# Patient Record
Sex: MALE | Race: WHITE | Employment: FULL TIME | URBAN - NONMETROPOLITAN AREA
[De-identification: names, ages, dates, MRNs, and addresses within clinical notes are randomized per-mention and may not be internally consistent; named-entity substitution may affect disease eponyms.]

---

## 2021-02-10 ENCOUNTER — HOSPITAL ENCOUNTER (OUTPATIENT)
Age: 56
Setting detail: OBSERVATION
Discharge: HOME | End: 2021-02-10
Attending: EMERGENCY MEDICINE | Admitting: INTERNAL MEDICINE
Payer: COMMERCIAL

## 2021-02-10 ENCOUNTER — APPOINTMENT (OUTPATIENT)
Dept: GENERAL RADIOLOGY | Age: 56
End: 2021-02-10
Payer: COMMERCIAL

## 2021-02-10 ENCOUNTER — APPOINTMENT (OUTPATIENT)
Dept: NON INVASIVE DIAGNOSTICS | Age: 56
End: 2021-02-10
Payer: COMMERCIAL

## 2021-02-10 VITALS
OXYGEN SATURATION: 97 % | WEIGHT: 315 LBS | DIASTOLIC BLOOD PRESSURE: 81 MMHG | SYSTOLIC BLOOD PRESSURE: 170 MMHG | HEIGHT: 73 IN | RESPIRATION RATE: 19 BRPM | TEMPERATURE: 97.3 F | HEART RATE: 82 BPM | BODY MASS INDEX: 41.75 KG/M2

## 2021-02-10 DIAGNOSIS — I20.0 UNSTABLE ANGINA (HCC): Primary | ICD-10-CM

## 2021-02-10 LAB
ABSOLUTE EOS #: 0.11 K/UL (ref 0–0.44)
ABSOLUTE IMMATURE GRANULOCYTE: <0.03 K/UL (ref 0–0.3)
ABSOLUTE LYMPH #: 1.76 K/UL (ref 1.1–3.7)
ABSOLUTE MONO #: 0.37 K/UL (ref 0.1–1.2)
ANION GAP SERPL CALCULATED.3IONS-SCNC: 8 MMOL/L (ref 9–17)
BASOPHILS # BLD: 1 % (ref 0–2)
BASOPHILS ABSOLUTE: 0.03 K/UL (ref 0–0.2)
BNP INTERPRETATION: NORMAL
BUN BLDV-MCNC: 13 MG/DL (ref 6–20)
BUN/CREAT BLD: 21 (ref 9–20)
CALCIUM SERPL-MCNC: 9 MG/DL (ref 8.6–10.4)
CHLORIDE BLD-SCNC: 103 MMOL/L (ref 98–107)
CO2: 26 MMOL/L (ref 20–31)
CREAT SERPL-MCNC: 0.63 MG/DL (ref 0.7–1.2)
D-DIMER QUANTITATIVE: 0.31 MG/L FEU (ref 0–0.59)
DIFFERENTIAL TYPE: NORMAL
EKG ATRIAL RATE: 60 BPM
EKG ATRIAL RATE: 69 BPM
EKG ATRIAL RATE: 69 BPM
EKG P AXIS: 51 DEGREES
EKG P AXIS: 54 DEGREES
EKG P AXIS: 67 DEGREES
EKG P-R INTERVAL: 166 MS
EKG P-R INTERVAL: 168 MS
EKG P-R INTERVAL: 170 MS
EKG Q-T INTERVAL: 406 MS
EKG Q-T INTERVAL: 410 MS
EKG Q-T INTERVAL: 432 MS
EKG QRS DURATION: 92 MS
EKG QTC CALCULATION (BAZETT): 432 MS
EKG QTC CALCULATION (BAZETT): 435 MS
EKG QTC CALCULATION (BAZETT): 439 MS
EKG R AXIS: -2 DEGREES
EKG R AXIS: -5 DEGREES
EKG R AXIS: 6 DEGREES
EKG T AXIS: 20 DEGREES
EKG T AXIS: 21 DEGREES
EKG T AXIS: 30 DEGREES
EKG VENTRICULAR RATE: 60 BPM
EKG VENTRICULAR RATE: 69 BPM
EKG VENTRICULAR RATE: 69 BPM
EOSINOPHILS RELATIVE PERCENT: 2 % (ref 1–4)
GFR AFRICAN AMERICAN: >60 ML/MIN
GFR NON-AFRICAN AMERICAN: >60 ML/MIN
GFR SERPL CREATININE-BSD FRML MDRD: ABNORMAL ML/MIN/{1.73_M2}
GFR SERPL CREATININE-BSD FRML MDRD: ABNORMAL ML/MIN/{1.73_M2}
GLUCOSE BLD-MCNC: 98 MG/DL (ref 70–99)
HCT VFR BLD CALC: 48.9 % (ref 40.7–50.3)
HEMOGLOBIN: 15.8 G/DL (ref 13–17)
IMMATURE GRANULOCYTES: 0 %
LV EF: 60 %
LVEF MODALITY: NORMAL
LYMPHOCYTES # BLD: 27 % (ref 24–43)
MCH RBC QN AUTO: 28 PG (ref 25.2–33.5)
MCHC RBC AUTO-ENTMCNC: 32.3 G/DL (ref 28.4–34.8)
MCV RBC AUTO: 86.7 FL (ref 82.6–102.9)
MONOCYTES # BLD: 6 % (ref 3–12)
NRBC AUTOMATED: 0 PER 100 WBC
PDW BLD-RTO: 13.1 % (ref 11.8–14.4)
PLATELET # BLD: 172 K/UL (ref 138–453)
PLATELET ESTIMATE: NORMAL
PMV BLD AUTO: 10.1 FL (ref 8.1–13.5)
POTASSIUM SERPL-SCNC: 4.1 MMOL/L (ref 3.7–5.3)
PRO-BNP: 157 PG/ML
RBC # BLD: 5.64 M/UL (ref 4.21–5.77)
RBC # BLD: NORMAL 10*6/UL
SEG NEUTROPHILS: 64 % (ref 36–65)
SEGMENTED NEUTROPHILS ABSOLUTE COUNT: 4.24 K/UL (ref 1.5–8.1)
SODIUM BLD-SCNC: 137 MMOL/L (ref 135–144)
TROPONIN INTERP: NORMAL
TROPONIN T: NORMAL NG/ML
TROPONIN, HIGH SENSITIVITY: <6 NG/L (ref 0–22)
TSH SERPL DL<=0.05 MIU/L-ACNC: 3.5 MIU/L (ref 0.3–5)
WBC # BLD: 6.5 K/UL (ref 3.5–11.3)
WBC # BLD: NORMAL 10*3/UL

## 2021-02-10 PROCEDURE — 6360000002 HC RX W HCPCS: Performed by: NURSE PRACTITIONER

## 2021-02-10 PROCEDURE — 99285 EMERGENCY DEPT VISIT HI MDM: CPT

## 2021-02-10 PROCEDURE — 96375 TX/PRO/DX INJ NEW DRUG ADDON: CPT

## 2021-02-10 PROCEDURE — 83036 HEMOGLOBIN GLYCOSYLATED A1C: CPT

## 2021-02-10 PROCEDURE — 85379 FIBRIN DEGRADATION QUANT: CPT

## 2021-02-10 PROCEDURE — 93010 ELECTROCARDIOGRAM REPORT: CPT | Performed by: FAMILY MEDICINE

## 2021-02-10 PROCEDURE — G0378 HOSPITAL OBSERVATION PER HR: HCPCS

## 2021-02-10 PROCEDURE — 6370000000 HC RX 637 (ALT 250 FOR IP): Performed by: EMERGENCY MEDICINE

## 2021-02-10 PROCEDURE — 2580000003 HC RX 258: Performed by: NURSE PRACTITIONER

## 2021-02-10 PROCEDURE — 2500000003 HC RX 250 WO HCPCS: Performed by: EMERGENCY MEDICINE

## 2021-02-10 PROCEDURE — 6370000000 HC RX 637 (ALT 250 FOR IP): Performed by: NURSE PRACTITIONER

## 2021-02-10 PROCEDURE — 99244 OFF/OP CNSLTJ NEW/EST MOD 40: CPT | Performed by: FAMILY MEDICINE

## 2021-02-10 PROCEDURE — 85025 COMPLETE CBC W/AUTO DIFF WBC: CPT

## 2021-02-10 PROCEDURE — 96366 THER/PROPH/DIAG IV INF ADDON: CPT

## 2021-02-10 PROCEDURE — 84443 ASSAY THYROID STIM HORMONE: CPT

## 2021-02-10 PROCEDURE — 71045 X-RAY EXAM CHEST 1 VIEW: CPT

## 2021-02-10 PROCEDURE — 96365 THER/PROPH/DIAG IV INF INIT: CPT

## 2021-02-10 PROCEDURE — 83880 ASSAY OF NATRIURETIC PEPTIDE: CPT

## 2021-02-10 PROCEDURE — 96372 THER/PROPH/DIAG INJ SC/IM: CPT

## 2021-02-10 PROCEDURE — 93306 TTE W/DOPPLER COMPLETE: CPT

## 2021-02-10 PROCEDURE — 6360000002 HC RX W HCPCS: Performed by: EMERGENCY MEDICINE

## 2021-02-10 PROCEDURE — 94761 N-INVAS EAR/PLS OXIMETRY MLT: CPT

## 2021-02-10 PROCEDURE — 84484 ASSAY OF TROPONIN QUANT: CPT

## 2021-02-10 PROCEDURE — 36415 COLL VENOUS BLD VENIPUNCTURE: CPT

## 2021-02-10 PROCEDURE — 93005 ELECTROCARDIOGRAM TRACING: CPT | Performed by: EMERGENCY MEDICINE

## 2021-02-10 PROCEDURE — 80048 BASIC METABOLIC PNL TOTAL CA: CPT

## 2021-02-10 RX ORDER — LORAZEPAM 2 MG/ML
1 INJECTION INTRAMUSCULAR ONCE
Status: COMPLETED | OUTPATIENT
Start: 2021-02-10 | End: 2021-02-10

## 2021-02-10 RX ORDER — SODIUM CHLORIDE 0.9 % (FLUSH) 0.9 %
10 SYRINGE (ML) INJECTION EVERY 12 HOURS SCHEDULED
Status: DISCONTINUED | OUTPATIENT
Start: 2021-02-10 | End: 2021-02-10 | Stop reason: HOSPADM

## 2021-02-10 RX ORDER — SODIUM CHLORIDE 9 MG/ML
INJECTION, SOLUTION INTRAVENOUS CONTINUOUS
Status: DISCONTINUED | OUTPATIENT
Start: 2021-02-10 | End: 2021-02-10 | Stop reason: HOSPADM

## 2021-02-10 RX ORDER — ASPIRIN 81 MG/1
81 TABLET, CHEWABLE ORAL DAILY
Status: DISCONTINUED | OUTPATIENT
Start: 2021-02-11 | End: 2021-02-10 | Stop reason: HOSPADM

## 2021-02-10 RX ORDER — ACETAMINOPHEN 325 MG/1
650 TABLET ORAL EVERY 6 HOURS PRN
Status: DISCONTINUED | OUTPATIENT
Start: 2021-02-10 | End: 2021-02-10 | Stop reason: HOSPADM

## 2021-02-10 RX ORDER — NITROGLYCERIN 20 MG/100ML
5 INJECTION INTRAVENOUS CONTINUOUS
Status: DISCONTINUED | OUTPATIENT
Start: 2021-02-10 | End: 2021-02-10

## 2021-02-10 RX ORDER — MORPHINE SULFATE 4 MG/ML
INJECTION, SOLUTION INTRAMUSCULAR; INTRAVENOUS
Status: DISCONTINUED
Start: 2021-02-10 | End: 2021-02-10 | Stop reason: WASHOUT

## 2021-02-10 RX ORDER — FAMOTIDINE 20 MG/1
20 TABLET, FILM COATED ORAL 2 TIMES DAILY
Status: DISCONTINUED | OUTPATIENT
Start: 2021-02-10 | End: 2021-02-10 | Stop reason: HOSPADM

## 2021-02-10 RX ORDER — PROMETHAZINE HYDROCHLORIDE 25 MG/1
12.5 TABLET ORAL EVERY 6 HOURS PRN
Status: DISCONTINUED | OUTPATIENT
Start: 2021-02-10 | End: 2021-02-10 | Stop reason: HOSPADM

## 2021-02-10 RX ORDER — METOPROLOL SUCCINATE 25 MG/1
25 TABLET, EXTENDED RELEASE ORAL DAILY
Status: DISCONTINUED | OUTPATIENT
Start: 2021-02-10 | End: 2021-02-10 | Stop reason: HOSPADM

## 2021-02-10 RX ORDER — SODIUM CHLORIDE 0.9 % (FLUSH) 0.9 %
10 SYRINGE (ML) INJECTION PRN
Status: DISCONTINUED | OUTPATIENT
Start: 2021-02-10 | End: 2021-02-10 | Stop reason: HOSPADM

## 2021-02-10 RX ORDER — NITROGLYCERIN 0.4 MG/1
0.4 TABLET SUBLINGUAL EVERY 5 MIN PRN
Status: DISCONTINUED | OUTPATIENT
Start: 2021-02-10 | End: 2021-02-10

## 2021-02-10 RX ORDER — ATORVASTATIN CALCIUM 40 MG/1
40 TABLET, FILM COATED ORAL NIGHTLY
Status: DISCONTINUED | OUTPATIENT
Start: 2021-02-10 | End: 2021-02-10 | Stop reason: HOSPADM

## 2021-02-10 RX ORDER — ONDANSETRON 2 MG/ML
4 INJECTION INTRAMUSCULAR; INTRAVENOUS EVERY 6 HOURS PRN
Status: DISCONTINUED | OUTPATIENT
Start: 2021-02-10 | End: 2021-02-10 | Stop reason: HOSPADM

## 2021-02-10 RX ORDER — ASPIRIN 81 MG/1
324 TABLET, CHEWABLE ORAL ONCE
Status: COMPLETED | OUTPATIENT
Start: 2021-02-10 | End: 2021-02-10

## 2021-02-10 RX ORDER — NITROGLYCERIN 0.4 MG/1
0.4 TABLET SUBLINGUAL ONCE
Status: COMPLETED | OUTPATIENT
Start: 2021-02-10 | End: 2021-02-10

## 2021-02-10 RX ORDER — ACETAMINOPHEN 650 MG/1
650 SUPPOSITORY RECTAL EVERY 6 HOURS PRN
Status: DISCONTINUED | OUTPATIENT
Start: 2021-02-10 | End: 2021-02-10 | Stop reason: HOSPADM

## 2021-02-10 RX ADMIN — NITROGLYCERIN 0.4 MG: 0.4 TABLET SUBLINGUAL at 10:13

## 2021-02-10 RX ADMIN — ENOXAPARIN SODIUM 40 MG: 40 INJECTION SUBCUTANEOUS at 15:09

## 2021-02-10 RX ADMIN — TICAGRELOR 180 MG: 90 TABLET ORAL at 08:53

## 2021-02-10 RX ADMIN — NITROGLYCERIN 0.4 MG: 0.4 TABLET SUBLINGUAL at 10:21

## 2021-02-10 RX ADMIN — ASPIRIN 324 MG: 81 TABLET, CHEWABLE ORAL at 08:53

## 2021-02-10 RX ADMIN — SODIUM CHLORIDE: 9 INJECTION, SOLUTION INTRAVENOUS at 15:30

## 2021-02-10 RX ADMIN — NITROGLYCERIN 5 MCG/MIN: 20 INJECTION INTRAVENOUS at 11:51

## 2021-02-10 RX ADMIN — LORAZEPAM 1 MG: 2 INJECTION INTRAMUSCULAR; INTRAVENOUS at 08:53

## 2021-02-10 RX ADMIN — ACETAMINOPHEN 650 MG: 325 TABLET ORAL at 15:07

## 2021-02-10 RX ADMIN — METOPROLOL SUCCINATE 25 MG: 25 TABLET, EXTENDED RELEASE ORAL at 15:07

## 2021-02-10 RX ADMIN — NITROGLYCERIN 0.4 MG: 0.4 TABLET SUBLINGUAL at 08:55

## 2021-02-10 ASSESSMENT — ENCOUNTER SYMPTOMS
TROUBLE SWALLOWING: 0
ABDOMINAL DISTENTION: 0
BACK PAIN: 1
COLOR CHANGE: 0
VOMITING: 0
SHORTNESS OF BREATH: 0
CONSTIPATION: 0
ABDOMINAL PAIN: 0
WHEEZING: 0
COUGH: 0
NAUSEA: 0
DIARRHEA: 0

## 2021-02-10 ASSESSMENT — PAIN DESCRIPTION - ORIENTATION
ORIENTATION: MID;ANTERIOR
ORIENTATION: MID
ORIENTATION: MID

## 2021-02-10 ASSESSMENT — PAIN DESCRIPTION - FREQUENCY
FREQUENCY: INTERMITTENT
FREQUENCY: CONTINUOUS

## 2021-02-10 ASSESSMENT — PAIN DESCRIPTION - PAIN TYPE
TYPE: ACUTE PAIN

## 2021-02-10 ASSESSMENT — PAIN DESCRIPTION - DESCRIPTORS
DESCRIPTORS: HEADACHE
DESCRIPTORS: PRESSURE
DESCRIPTORS: SQUEEZING
DESCRIPTORS: PRESSURE

## 2021-02-10 ASSESSMENT — PAIN SCALES - GENERAL
PAINLEVEL_OUTOF10: 3
PAINLEVEL_OUTOF10: 1
PAINLEVEL_OUTOF10: 3

## 2021-02-10 ASSESSMENT — PAIN DESCRIPTION - LOCATION
LOCATION: HEAD
LOCATION: CHEST

## 2021-02-10 NOTE — ED NOTES
Supervisor contacted to transport patient to ICU, she states she is busy at this time.       Ozzy Adams RN  02/10/21 7157

## 2021-02-10 NOTE — PROGRESS NOTES
Pt states that he had something come up at home and he needs to leave and states \"I will leave against medical advice\", writer informed pt of the risk of leaving without completing care. He states he understands but needs to leave. Jono Ibarra NP, she spoke with pt about risk of leaving, pt still wishes to leave against medical advice. Mook Fisher NP states that she will update [place].

## 2021-02-10 NOTE — ED PROVIDER NOTES
[facility]  EMERGENCY DEPARTMENT ENCOUNTER      Pt Denver Calderón  MRN: [id number]  [place] 1965  Date of evaluation: 2/10/2021  Provider: Otf Mixon MD    [address] [place] [address]     Chief Complaint   Patient presents with    Chest Pain     pt c/o mid sternal chest pain , onset Friday         HISTORY OF PRESENT ILLNESS   (Location/Symptom, Timing/Onset, Context/Setting, Quality, Duration, Modifying Factors, Severity)  Note limiting factors. HPI the patient is a 44-year-old male who presents with chest pain. The pain is a heaviness and pressure that is substernal.  At rest it is a level 2 and with activity it goes up to a level 5. It seems to radiate to his upper back. This pain started 5 days ago. Patient thought it was just his anxiety. He is not diaphoretic with the pain. Patient has a history of diabetes, hypertension and hyperlipidemia. The patient has never smoked. He recently lost 120 pounds after gastric bypass and has been taken off of all of his medications by his PCP. Nursing Notes were reviewed. REVIEW OF SYSTEMS    (2-9 systems for level 4, 10 or more for level 5)     Review of Systems   Constitutional: Positive for activity change. Negative for appetite change, chills, diaphoresis, fatigue and fever. HENT: Negative for congestion and trouble swallowing. Eyes: Negative for visual disturbance. Respiratory: Negative for cough, shortness of breath and wheezing. Cardiovascular: Positive for chest pain. Negative for palpitations and leg swelling. Gastrointestinal: Negative for abdominal distention, abdominal pain, constipation, diarrhea, nausea and vomiting. Genitourinary: Negative for dysuria and frequency. Musculoskeletal: Positive for back pain. Negative for gait problem and neck pain. Skin: Negative for color change and rash. Neurological: Negative for light-headedness and headaches. Psychiatric/Behavioral: Negative for confusion, decreased concentration and dysphoric mood. MEDICAL HISTORY     Past Medical History:   Diagnosis Date    Anxiety     Arthritis     Bipolar 1 disorder ([facility]     Depression     Diabetes mellitus ([facility]     Hyperlipidemia     Hypertension     Mental health problem     Thyroid disease          SURGICAL HISTORY       Past Surgical History:   Procedure Laterality Date    CHOLECYSTECTOMY      TONSILLECTOMY           CURRENT MEDICATIONS       Previous Medications    No medications on file       ALLERGIES     Patient has no known allergies. FAMILY HISTORY     History reviewed. No pertinent family history.        SOCIAL HISTORY       Social History     Socioeconomic History    Marital status:      Spouse name: None    Number of children: None    Years of education: None    Highest education level: None   Occupational History    None   Social Needs    Financial resource strain: None    Food insecurity     Worry: None     Inability: None    Transportation needs     Medical: None     Non-medical: None   Tobacco Use    Smoking status: Former Smoker     Quit date: 2007     Years since quittin.8    Smokeless tobacco: Never Used   Substance and Sexual Activity    Alcohol use: No    Drug use: No    Sexual activity: None   Lifestyle    Physical activity     Days per week: None     Minutes per session: None    Stress: None   Relationships    Social connections     Talks on phone: None     Gets together: None     Attends Cheondoism service: None     Active member of club or organization: None     Attends meetings of clubs or organizations: None     Relationship status: None    Intimate partner violence     Fear of current or ex partner: None     Emotionally abused: None     Physically abused: None     Forced sexual activity: None   Other Topics Concern    None   Social History Narrative    None       SCREENINGS Cornel Coma Scale  Eye Opening: Spontaneous  Best Verbal Response: Oriented  Best Motor Response: Obeys commands  Cornel Coma Scale Score: 15        PHYSICAL EXAM  (up to 7 for level 4, 8 or more for level 5)     ED Triage Vitals [02/10/21 0818]   BP Temp Temp Source Pulse Resp SpO2 Height Weight   (!) 162/77 97.4 °F (36.3 °C) Temporal 71 16 97 % -- (!) 390 lb (176.9 kg)       Physical Exam  Constitutional:       General: He is not in acute distress. Appearance: Normal appearance. He is obese. He is not ill-appearing, toxic-appearing or diaphoretic. HENT:      Head: Normocephalic and atraumatic. Eyes:      Extraocular Movements: Extraocular movements intact. Conjunctiva/sclera: Conjunctivae normal.      Pupils: Pupils are equal, round, and reactive to light. Neck:      Musculoskeletal: Normal range of motion and neck supple. Cardiovascular:      Rate and Rhythm: Normal rate and regular rhythm. Pulses: Normal pulses. Heart sounds: Normal heart sounds. Pulmonary:      Effort: Pulmonary effort is normal.      Breath sounds: Normal breath sounds. Abdominal:      General: Abdomen is flat. Bowel sounds are normal.      Palpations: Abdomen is soft. Tenderness: There is no abdominal tenderness. Musculoskeletal: Normal range of motion. Right lower leg: Edema present. Left lower leg: Edema present. Skin:     General: Skin is warm and dry. Neurological:      General: No focal deficit present. Mental Status: He is alert and oriented to person, place, and time. Mental status is at baseline. Psychiatric:         Mood and Affect: Mood normal.         Behavior: Behavior normal.         Thought Content:  Thought content normal.         Judgment: Judgment normal.         DIAGNOSTIC RESULTS     EKG: All EKG's are interpreted by the Emergency Department Physician whoeither signs or Co-signs this chart in the absence of a cardiologist. Normal sinus rhythm at a rate of 69. Normal intervals. Axis of 6. Normal QRS. Normal ST-T waves. Normal EKG. second EKG shows normal sinus rhythm at 69. Normal intervals. Axis of -2. Normal QRS. Normal ST-T waves. No ischemic changes. 30 EKG shows ventricular rate of 69 normal sinus rhythm. Normal intervals. -5 axis. Normal QRS. Normal ST-T waves. Normal EKG. RADIOLOGY:   Non-plain film images such as CT, Ultrasound and MRI are read by the radiologist. Plain radiographic images are visualized and preliminarily interpreted by the emergency physician     Interpretation per the Radiologist below, if available at the time of this note:    XR CHEST PORTABLE   Final Result   No acute cardiopulmonary disease. ED BEDSIDE ULTRASOUND:   Performed by ED Physician - none    LABS:  Labs Reviewed   BASIC METABOLIC PANEL - Abnormal; Notable for the following components:       Result Value    CREATININE 0.63 (*)     Bun/Cre Ratio 21 (*)     Anion Gap 8 (*)     All other components within normal limits   CBC WITH AUTO DIFFERENTIAL   TROPONIN   D-DIMER, QUANTITATIVE   TROPONIN   TROPONIN   BRAIN NATRIURETIC PEPTIDE       EMERGENCY DEPARTMENT COURSE and DIFFERENTIAL DIAGNOSIS/MDM:   Vitals:    Vitals:    02/10/21 1115 02/10/21 1140 02/10/21 1210 02/10/21 1220   BP: 137/77  131/63 138/82   Pulse: 68 69 70 67   Resp: 14 17 18 17   Temp:       TempSrc:       SpO2: 96% 97% 96% 96%   Weight:               MDM the patient is responding to nitroglycerin. However given that sublingual he would improve to no pain and then over a small amount of time the pain would come back. Therefore he has been started on a nitro drip. He has 3 - troponins and 3 - EKGs. His vital signs look good. He will be admitted for unstable angina. Dr. Cristofer Colunga is the admitting doctor. CONSULTS:  None    PROCEDURES:  Unless otherwise noted below, none     Procedures    FINAL IMPRESSION      1.  Unstable angina (HCC) DISPOSITION/PLAN   DISPOSITION Decision To Admit 02/10/2021 12:34:09 PM      PATIENT REFERRED TO:  No follow-up provider specified.     DISCHARGE MEDICATIONS:  New Prescriptions    No medications on file              (Please note that portions ofthis note were completed with a voice recognition program.  Efforts were made to edit the dictations but occasionally words are mis-transcribed.)      Yon Torre MD (electronically signed)  Attending Emergency Physician          Juanpablo Cabrera MD  02/10/21 3559

## 2021-02-10 NOTE — H&P
ICU Admission [facility] Medicine  History and Physical    Patient:  Jonna Hunt  MRN: [id number]    Chief Complaint: Chest pain    History Obtained From:  patient, electronic medical record    PCP: Elva Panchal MD    History of Present Illness: The patient is a 50 y.o. male who presented to the emergency room today with intermittent chest pain that began on Friday. He described the pain as heaviness and pressure substernal with radiation to his neck. He explained that the pain has been both with activity and at rest including awaking him from sleeping. Initially he felt that it was possibly anxiety however it did not resolve. He denied any diaphoresis with the pain. He does have a history of diabetes, hypertension and hyperlipidemia. He is a morbid obesity patient who previously was greater than 600 pounds and is currently 410 pounds. His primary physician is in [place]. He did use in the ER sublingual nitroglycerin which did resolve the pain however it came right back. He was placed on a nitroglycerin drip at 5 mics and admitted to ICU. Patient previously was on medications: Atenolol, Zocor, Prilosec, lisinopril, Synthroid, metformin, trazodone, Cymbalta. Patient stated that after his gastric bypass his family physician told him he no longer needed these medications. He stated that he had labs completed a couple months ago and everything was fine. He currently is on no medications including his thyroid    Past Medical History:        Diagnosis Date    Anxiety     Arthritis     Bipolar 1 disorder ([facility] [address]     Depression     Diabetes mellitus ([facility] [facility]     Hyperlipidemia     Hypertension     Mental health problem     Thyroid disease        Past Surgical History:        Procedure Laterality Date    CHOLECYSTECTOMY      TONSILLECTOMY         Family History:   History reviewed. No pertinent family history.     Social History: TOBACCO:   reports that he quit smoking about 13 years ago. He has never used smokeless tobacco.  ETOH:   reports no history of alcohol use. ELICIT DRUG USE:    Social History     Substance and Sexual Activity   Drug Use No     OCCUPATION: Unknown    Allergies:  Patient has no known allergies. Medications Prior to Admission:    Prior to Admission medications    Not on File       Review of Systems:  Constitutional:negative  for fevers, and negative for chills. Eyes: negative for visual disturbance   ENT: negative for sore throat, negative nasal congestion, and negative for earache  Respiratory: negative for shortness of breath, negative for cough, and negative for wheezing  Cardiovascular: positive for chest pain, negative for palpitations, and negative for syncope  Gastrointestinal: negative for abdominal pain, negative for nausea,negative for vomiting, negative for diarrhea, negative for constipation, and negative for hematochezia or melena  Genitourinary: negative for dysuria, negative for urinary urgency, negative for urinary frequency, and negative for hematuria  Skin: negative for skin rash, and negative for skin lesions  Neurological: negative for unilateral weakness, numbness or tingling. Physical Exam:    Vitals:  Temp: 97.3 °F (36.3 °C)  BP: (!) 149/72  Resp: 12  Pulse: 69  SpO2: 97 %  24HR INTAKE/OUTPUT:  No intake or output data in the 24 hours ending 02/10/21 1506    Exam:  GEN:  alert and oriented to person, place and time, well-developed and well-nourished, in no acute distress and morbidly obese  EYES: No gross abnormalities. , PERRL and EOMI  NECK: normal, supple, no lymphadenopathy,  no carotid bruits  PULM: clear to auscultation bilaterally- no wheezes, rales or rhonchi, normal air movement, no respiratory distress  COR: regular rate & rhythm, no murmurs, no gallops, S1 normal and S2 normal  ABD:  soft, non-tender, non-distended, normal bowel sounds, no masses or organomegaly EXT:   no cyanosis, clubbing or edema present    NEURO: follows commands, HERNANDEZ, no deficits  SKIN:  no rashes or significant lesions  -----------------------------------------------------------------  Diagnostic Data:   Lab Results   Component Value Date    WBC 6.5 02/10/2021    HGB 15.8 02/10/2021     02/10/2021       Lab Results   Component Value Date    BUN 13 02/10/2021    CREATININE 0.63 (L) 02/10/2021     02/10/2021    K 4.1 02/10/2021    CALCIUM 9.0 02/10/2021     02/10/2021    CO2 26 02/10/2021    LABGLOM >60 02/10/2021       No results found for: [place], EPITHUA, LEUKOCYTESUR, SPECGRAV, GLUCOSEU, KETUA, PROTEINU, HGBUR, CASTUA, CRYSTUA, BACTERIA, YEAST    Lab Results   Component Value Date    TROPONINT NOT REPORTED 02/10/2021    CKTOTAL 160 04/20/2015    CKMB 2.4 04/20/2015    PROBNP 157 02/10/2021       Xr Chest Portable    Result Date: 2/10/2021  EXAMINATION: ONE XRAY VIEW OF THE CHEST 2/10/2021 8:31 am COMPARISON: 04/20/2015 HISTORY: ORDERING SYSTEM PROVIDED HISTORY: cp TECHNOLOGIST PROVIDED HISTORY: cp FINDINGS: Single view of the chest was obtained. No confluent focal pulmonary opacities to suggest acute airspace disease. No evidence of pleural effusion or pneumothorax. Cardiac and mediastinal silhouettes are within normal limits. Cardiac leads project over the chest.     No acute cardiopulmonary disease. EKG interpretation:     2/10/2021 @ 0823        2/10/2021@ 0928        2/10/2021 @ 1124        Assessment:    Active Problems:    Unstable angina (HCC)  Resolved Problems:    * No resolved hospital problems.  *      Patient Active Problem List    Diagnosis Date Noted    Unstable angina ([facility] [address] 02/10/2021    Recurrent chest pain 04/22/2015    Angina, class III ([address] 04/22/2015    HTN (hypertension) 04/20/2015    Hyperlipidemia 04/20/2015    Morbid obesity ([facility] 04/20/2015    DM (diabetes mellitus) ([facility] 04/20/2015       Plan: · This patient requires inpatient ICU admission because of unstable angina  · Factors affecting the medical complexity of this patient include hypertension, hyperlipidemia, diabetes, morbid obesity  · Estimated length of stay is 2 days  · Discussed patient's symptoms and data results including labs and imaging studies with the ER MD at time of admission  · Unstable angina  · Nitroglycerin drip at 5 mics  · Continue Toprol-XL 25 mg daily  · Appreciate cardiology  · Normal saline at 50 mL's per hour  · Lipid panel in the morning  · TSH  · Echocardiogram  · High risk medications: None      CORE MEASURES  DVT prophylaxis: Lovenox  Decubitus ulcer present on admission: No  CODE STATUS: FULL CODE  Nutrition Status: good   Physical therapy: NA   Old Charts reviewed: Yes  EKG Reviewed:  Yes  Advance Directive Addressed: Yes    SABINO Rodriguez, NP-C  2/10/2021, 3:06 PM For this evening can give haldol/ativan 5mg/2mg q6h po/im prn agitation/anxiety. For sleep can give seroquel 25mg po qhs prn insomnia.

## 2021-02-10 NOTE — ED NOTES
Contacted Dr. Tim Monae as hospitalist per Dr. Stefany Kim request.     Trenton THOMASON Reser  02/10/21 [id number]

## 2021-02-10 NOTE — CONSULTS
Ashanti Coughlin am scribing for and in the presence of Haley Cheung MD, F.A.C.C..    Patient: Wilmer Steven  : 1965  Date of Admission: 2/10/2021  Primary Care Physician: Kasie Naranjo  Today's Date: 2/10/2021    REASON FOR CONSULTATION/CC: Chest discomfort    HPI: I had the pleasure of seeing Mr. Esteban Partida today who is a 50 y.o. male with a history of intermittent chest discomfort leading to this consultation. Echocardiogram done today on 2/10/2021 showed an EF of 60% Mildly increased LV wall thickness. Mild tricuspid regurgitation. Mild diastolic dysfunction is seen. Chest Discomfort: Mr. Esteban Partida reports that he has had on and off for the last week or so but this morning, it came on and did not stop leading him to come to the ER> He describes the discomfort as a pressure quality; occurring multiple times per week; of moderate intensity; Associated symptoms: shortness of breath; Exacerbating factors: none; Remediating factors: nitroglycerin     Mr. Esteban Partida recently has a gastric sleeve done. Has been having anxiety attacks along with chest pain and on Friday it felt different and it didn't go away and became concerned so he came to the emergency room. He is feeling better but does have a headache today due to the Nitro drip. He last had the chest pain a couple hours ago. His breathing is improving, he hasn't noticed any lightheaded/dizziness because he has hardly been out of bed. He denied any current chest pain, shortness of breath, abdominal pain, bleeding problems, problems with his medications or any other concerns at this time.      Past Medical History:   Diagnosis Date    Anxiety     Arthritis     Bipolar 1 disorder ([facility] [address]     Depression     [facility]     Hyperlipidemia     Hypertension     Mental health problem     Thyroid disease CURRENT ALLERGIES: Patient has no known allergies. REVIEW OF SYSTEMS: 14 systems were reviewed. Pertinent positives and negatives as above, all else negative. Past Surgical History:   Procedure Laterality Date    CHOLECYSTECTOMY      TONSILLECTOMY      Social History:  Social History     Tobacco Use    Smoking status: Former Smoker     Quit date: 2007     Years since quittin.8    Smokeless tobacco: Never Used   Substance Use Topics    Alcohol use: No    Drug use: No        OUTPATIENT MEDICATIONS:  No outpatient medications have been marked as taking for the 2/10/21 encounter [facility] Encounter).   nitroGLYCERIN 50 mg in dextrose 5% 250 mL infusion, Continuous      0.9 % sodium chloride infusion, Continuous      sodium chloride flush 0.9 % injection 10 mL, 2 times per day      sodium chloride flush 0.9 % injection 10 mL, PRN      famotidine (PEPCID) tablet 20 mg, BID      promethazine (PHENERGAN) tablet 12.5 mg, Q6H PRN    Or      ondansetron (ZOFRAN) injection 4 mg, Q6H PRN      acetaminophen (TYLENOL) tablet 650 mg, Q6H PRN    Or      acetaminophen (TYLENOL) suppository 650 mg, Q6H PRN      [START ON 2021] aspirin chewable tablet 81 mg, Daily      enoxaparin (LOVENOX) injection 40 mg, Daily      atorvastatin (LIPITOR) tablet 40 mg, Nightly      metoprolol succinate (TOPROL XL) extended release tablet 25 mg, Daily        FAMILY HISTORY: family history is not on file. PHYSICAL EXAM:   BP (!) 155/84   Pulse 65   Temp 97.3 °F (36.3 °C) (Temporal)   Resp 14   Ht 6' 1\" (1.854 m)   Wt (!) 410 lb 11.2 oz (186.3 kg)   SpO2 97%   BMI 54.19 kg/m²  Body mass index is 54.19 kg/m². Constitutional: He is oriented to person, place, and time. He appears well-developed and well-nourished. In no acute distress. HEENT: Normocephalic and atraumatic. No JVD present. Carotid bruit is not present. No mass and no thyromegaly present. No lymphadenopathy present. Cardiovascular: Normal rate, regular rhythm, normal heart sounds. Exam reveals no gallop and no friction rubs. 2/6 systolic murmur, 5th intercostal space on the LEFT in the mid-clavicular line (cardiac apex). Pulmonary/Chest: Effort normal and breath sounds normal. No respiratory distress. He has no wheezes, rhonchi or rales. Abdominal: Soft, non-tender. Bowel sounds and aorta are normal. He exhibits no organomegaly, mass or bruit. Extremities: Trace. No cyanosis or clubbing. 2+ radial and carotid pulses. Distal extremity pulses: 2+ bilaterally. .  Neurological: He is alert and oriented to person, place, and time. No evidence of gross cranial nerve deficit. Coordination appeared normal.   Skin: Skin is warm and dry. There is no rash or diaphoresis. Psychiatric: He has a normal mood and affect. His speech is normal and behavior is normal.        MOST RECENT LABS ON RECORD:   Lab Results   Component Value Date    WBC 6.5 02/10/2021    HGB 15.8 02/10/2021    HCT 48.9 02/10/2021     02/10/2021     02/10/2021    K 4.1 02/10/2021     02/10/2021    CREATININE 0.63 (L) 02/10/2021    BUN 13 02/10/2021    CO2 26 02/10/2021    INR 1.0 04/20/2015       ASSESSMENT:  Patient Active Problem List    Diagnosis Date Noted    Unstable angina ([facility] 02/10/2021     Priority: Low    Recurrent chest pain 04/22/2015     Priority: Low    [facility] 04/22/2015     Priority: Low    HTN (hypertension) 04/20/2015     Priority: Low    Hyperlipidemia 04/20/2015     Priority: Low    Morbid obesity ([facility] [address] 04/20/2015     Priority: Low    DM (diabetes mellitus) ([facility] 04/20/2015     Priority: Low      PLAN:  · Unstable angina. I explained to him that although he does not appear to have had a heart attack at this point, I really do think that this may be the early stages of a heart attack and that re really needed to hold him overnight and do a stress test in the morning which he agreed with. · Additional Testing List: Because current signs and symptoms can certainly be caused by significant coronary artery disease, I ordered a Regadenoson (Lexiscan) stress test with SPECT imaging to try and rule out this possibility. · Typical chest pain consistent with myocardial ischemia:   · Symptoms include anxiety, will discuss medication further   · Medical Management for suspected coronary artery disease  ? Antiplatelet Agent: Continue Aspirin 81 mg daily. ? Beta Blocker: Continue Metoprolol succinate (Toprol XL) 25 mg daily. ? Anti-anginal medications: Okay to start titrate down on the Nitro drip  ? Cholesterol Reduction Therapy: Continue Atorvastatin (Lipitor) 40 mg daily. ? Additional Testing List: Because current signs and symptoms can certainly be caused by significant coronary artery disease, I ordered a Regadenoson (Lexiscan) stress test with SPECT imaging to try and rule out this possibility. ? Counseling: I advised Mr. Dionte Carver to call our office or go to the emergency room if he develops worsening or persistent chest pain or shortness of breath as this could be life threatening. Once again, thank you for allowing me to participate in this patients care. Please do not hesitate to contact me could I be of further assistance. Sincerely,  Uyen Velez MD, MS, F.A.C.C. [facility] Cardiology Specialist    [address] [place] [address], [address] [place] [address]  Phone: [phone], Fax: [phone]     I believe that the risk of significant morbidity and mortality related to the patient's current medical conditions are: intermediate-high. The documentation recorded by the scribe, accurately and completely reflects the services I personally performed and the decisions made by me. Patrick Coronado MD, MS, F.A.C.C.  February 10, 2021

## 2021-02-10 NOTE — DISCHARGE SUMMARY
Discharge Summary    Katlyn Castillo  :  1965  MRN:  [id number]    Admit date:  2/10/2021      Discharge date: 2/10/2021     Admitting Physician:  Shahram Manzanares MD    Discharge Diagnoses:    Principal Problem:    Unstable angina [facility])  Resolved Problems:    * No resolved hospital problems. *      Hospital Course:   Katlyn Castillo is a 50 y.o. male admitted with unstable angina. He presented to the emergency room today with intermittent chest pain that began on Friday. He described the pain as heaviness and pressure substernal with radiation to his neck. He explained that the pain has been both with activity and at rest including awaking him from sleeping. Initially he felt that it was possibly anxiety however it did not resolve. He denied any diaphoresis with the pain. He does have a history of diabetes, hypertension and hyperlipidemia. He is a morbid obesity patient who previously was greater than 600 pounds and is currently 410 pounds. His primary physician is in [place]. He did use in the ER sublingual nitroglycerin which did resolve the pain however it came right back. He was placed on a nitroglycerin drip at 5 mics and admitted to ICU. Cardiology was consulted. Nitro drip was weaned off and plan was for a stress test tomorrow. Patient was agreeable however shortly after Cardiology left, he stated that something came up and he had to leave and would follow up for a stress test at a later date. I had thorough discussion with him regarding this decision and the concern of possibly death due to untreated cardiac care. He voiced his understanding but continued to refused.   He is agreeable to signing his AMA paperwork and will leave.     Patient previously was on medications: Atenolol, Zocor, Prilosec, lisinopril, Synthroid, metformin, trazodone, Cymbalta. Patient stated that after his gastric bypass his family physician told him he no longer needed these medications. He stated that he had labs completed a couple months ago and everything was fine. He currently is on no medications including his thyroid    Consultants:  Dr. Juan Antonio Gibson, cardiology    Procedures: none    Complications: none    Discharge Condition: stable    Exam:  GEN:  alert and oriented to person, place and time, well-developed and well-nourished, in no acute distress and morbidly obese  EYES:  No gross abnormalities. , PERRL and EOMI  NECK: normal, supple, no lymphadenopathy,  no carotid bruits  PULM: clear to auscultation bilaterally- no wheezes, rales or rhonchi, normal air movement, no respiratory distress  COR:   regular rate & rhythm, no murmurs, no gallops, S1 normal and S2 normal  ABD:    soft, non-tender, non-distended, normal bowel sounds, no masses or organomegaly  EXT:    no cyanosis, clubbing or edema present    NEURO: follows commands, HERNANDEZ, no deficits  SKIN:   no rashes or significant lesions    Significant Diagnostic Studies:   Lab Results   Component Value Date    WBC 6.5 02/10/2021    HGB 15.8 02/10/2021     02/10/2021       Lab Results   Component Value Date    BUN 13 02/10/2021    CREATININE 0.63 (L) 02/10/2021     02/10/2021    K 4.1 02/10/2021    CALCIUM 9.0 02/10/2021     02/10/2021    CO2 26 02/10/2021    LABGLOM >60 02/10/2021       No results found for: Firman Brian, EPITHUA, LEUKOCYTESUR, SPECGRAV, GLUCOSEU, KETUA, PROTEINU, HGBUR, CASTUA, CRYSTUA, BACTERIA, YEAST    Echocardiogram Complete 2d With Doppler With Color    Result Date: 2/10/2021 [facility] Transthoracic Echocardiography Report (TTE)  Patient Name Aquilino Segundo      Date of Study               02/10/2021               Anyi THOMASON   Date of      1965  Gender                      Male  Birth   Age          50 year(s)  Race                           Room Number  I307        Height:                     73 inch, 185.42 cm   Corporate ID [id number]    Weight:                     410 pounds, 186 kg  #   Patient Acct [de-identified]   BSA:          2.92 m^2      BMI:      54.09  #                                                              kg/m^2   MR #         [id number]      Sonographer                 Elayne Ahmadi   Accession #  [id number]  Interpreting Physician      Mihai Kessler   Fellow                   Referring Nurse             Cathy Valadez CNP                           Practitioner   Interpreting             Referring Physician  Fellow  Type of Study   TTE procedure:2D Echocardiogram, M-Mode, Doppler, Color Doppler. Procedure Date Date: 02/10/2021 Start: 02:47 PM Study Location: [facility]. History / Tech. Comments: Dx: unstable angina Hx: DM, HTN, hyperlipidemia Patient Status: Inpatient Height: 73 inches Weight: 410 pounds BSA: 2.92 m^2 BMI: 54.09 kg/m^2 BP: 149/72 mmHg Allergies   - *No Known Allergies. CONCLUSIONS Summary Global left ventricular systolic function appears preserved with an estimated ejection fraction of 60%. Mildly increased left ventricular wall thickness with a normal left ventricular cavity size. No definite specific wall motion abnormalities were identified. Mild tricuspid regurgitation. Evidence of mild diastolic dysfunction is seen.  Signature ----------------------------------------------------------------------------  Electronically signed by Elayne Ahmadi(Sonographer) on 02/10/2021 03:42 PM ---------------------------------------------------------------------------- ----------------------------------------------------------------------------  Electronically signed by Gage KesslerInterpreting physician) on 02/10/2021  05:16 PM ---------------------------------------------------------------------------- FINDINGS Left Atrium Left atrium is normal in size. Left Ventricle Global left ventricular systolic function appears preserved with an estimated ejection fraction of 60%. Mildly increased left ventricular wall thickness with a normal left ventricular cavity size. No definite specific wall motion abnormalities were identified. Right Atrium Right atrium is normal in size. Right Ventricle Normal right ventricular size and function. Mitral Valve Normal mitral valve structure and function. Aortic Valve Normal aortic valve structure and function without stenosis or regurgitation. Tricuspid Valve Normal tricuspid valve structure with mild tricuspid regurgitation. Pulmonic Valve The pulmonic valve is normal in structure. Pericardial Effusion No significant pericardial effusion is seen. Miscellaneous Evidence of mild diastolic dysfunction is seen. Normal aortic root dimension.  M-mode / 2D Measurements & Calculations:   LVIDd:5.2 cm(3.7 - 5.6 cm)       Diastolic SKGPLW:637.0 ml  LVIDs:3.79 cm(2.2 - 4.0 cm)      Systolic VWFONO:01.0 ml  IGO.77 cm(0.6 - 1.1 cm)       Aortic Root:3.87 cm(2.0 - 3.7 cm)  LVPWd:1.2 cm(0.6 - 1.1 cm)       LA Dimension: 4.76 cm(1.9 - 4.0 cm)  Fractional Shortenin.12 %    LA volume/Index: 73.3 ml /25m^2  Calculated LVEF (%): 61.46 %     AV Cusp Separation: 2.4 cm   Mitral:                                 Aortic   Valve Area (P1/2-Time): 2.69 cm^2       Peak Velocity: 1.17 m/s  Peak E-Wave: 0.66 m/s                   Mean Velocity: 0.86 m/s  Peak A-Wave: 0.73 m/s                   Peak Gradient: 5.51 mmHg  E/A Ratio: 0.9                          Mean Gradient: 3.47 mmHg  Peak Gradient: 1.72 mmHg                                          Acceleration Time: 60.78 msec  P1/2t: 81.72 msec                                          AV VTI: 25.69 cm   Tricuspid:   Peak TR Velocity: 2.43 m/s  Peak TR Gradient: 16.73765 mmHg  Diastology / Tissue Doppler Lateral Wall E' velocity:0.08 m/s Lateral Wall E/E':8.07    Xr Chest Portable    Result Date: 2/10/2021  EXAMINATION: ONE XRAY VIEW OF THE CHEST 2/10/2021 8:31 am COMPARISON: 04/20/2015 HISTORY: ORDERING SYSTEM PROVIDED HISTORY: cp TECHNOLOGIST PROVIDED HISTORY: cp FINDINGS: Single view of the chest was obtained. No confluent focal pulmonary opacities to suggest acute airspace disease. No evidence of pleural effusion or pneumothorax. Cardiac and mediastinal silhouettes are within normal limits. Cardiac leads project over the chest.     No acute cardiopulmonary disease. Assessment and Plan:  Patient Active Problem List    Diagnosis Date Noted    Unstable angina ([facility] 02/10/2021    Recurrent chest pain 04/22/2015    Angina, class III ([facility] 04/22/2015    HTN (hypertension) 04/20/2015    Hyperlipidemia 04/20/2015    Morbid obesity ([facility] 04/20/2015    DM (diabetes mellitus) ([facility] 04/20/2015        Discharge Medications: There are no discharge medications for this patient. Patient Instructions:    Activity: activity as tolerated  Diet: low fat, low cholesterol diet  Wound Care: none needed  Other: None     Disposition:   Discharge to Home -- AMA    Follow up:  Patient will be followed by James Lo MD in 1-2 weeks    CORE MEASURES on Discharge (if applicable)  ACE/ARB in CHF: NA  Statin in MI: NA  ASA in MI: NA  Statin in CVA: NA  Antiplatelet in CVA: NA    Total time spent on discharge services: 45 minutes    Including the following activities:  Evaluation and Management of patient  Discussion with patient and/or surrogate about current care plan  Coordination with Case Management and/or   Coordination of care with Consultants (if applicable) Coordination of care with Receiving Facility Physician (if applicable)  Completion of DME forms (if applicable)  Preparation of Discharge Summary  Preparation of Medication Reconciliation  Preparation of Discharge Prescriptions    Signed:  SABINO Reynaga, NP-C  2/10/2021, 6:15 PM

## 2021-02-11 LAB
ESTIMATED AVERAGE GLUCOSE: 100 MG/DL
HBA1C MFR BLD: 5.1 % (ref 4–6)

## 2025-01-23 RX ORDER — ELECTROLYTES/DEXTROSE
1 SOLUTION, ORAL ORAL
COMMUNITY

## 2025-01-23 RX ORDER — LANOLIN ALCOHOL/MO/W.PET/CERES
1 CREAM (GRAM) TOPICAL 2 TIMES DAILY
COMMUNITY

## 2025-01-23 RX ORDER — BUSPIRONE HYDROCHLORIDE 10 MG/1
10 TABLET ORAL 2 TIMES DAILY
COMMUNITY